# Patient Record
Sex: FEMALE | Race: WHITE | HISPANIC OR LATINO
[De-identification: names, ages, dates, MRNs, and addresses within clinical notes are randomized per-mention and may not be internally consistent; named-entity substitution may affect disease eponyms.]

---
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
## 2019-06-09 ENCOUNTER — TRANSCRIPTION ENCOUNTER (OUTPATIENT)
Age: 19
End: 2019-06-09

## 2019-06-12 ENCOUNTER — OTHER (OUTPATIENT)
Age: 19
End: 2019-06-12

## 2019-06-21 ENCOUNTER — APPOINTMENT (OUTPATIENT)
Dept: PEDIATRICS | Facility: CLINIC | Age: 19
End: 2019-06-21
Payer: COMMERCIAL

## 2019-06-21 VITALS
SYSTOLIC BLOOD PRESSURE: 102 MMHG | HEIGHT: 67 IN | WEIGHT: 172.8 LBS | DIASTOLIC BLOOD PRESSURE: 54 MMHG | HEART RATE: 71 BPM | BODY MASS INDEX: 27.12 KG/M2

## 2019-06-21 DIAGNOSIS — M93.002 UNSPECIFIED SLIPPED UPPER FEMORAL EPIPHYSIS (NONTRAUMATIC), LEFT HIP: ICD-10-CM

## 2019-06-21 PROCEDURE — 90651 9VHPV VACCINE 2/3 DOSE IM: CPT

## 2019-06-21 PROCEDURE — 90471 IMMUNIZATION ADMIN: CPT

## 2019-06-21 PROCEDURE — 99395 PREV VISIT EST AGE 18-39: CPT | Mod: 25

## 2019-06-21 PROCEDURE — 92552 PURE TONE AUDIOMETRY AIR: CPT

## 2019-06-21 NOTE — DISCUSSION/SUMMARY
[] : The components of the vaccine(s) to be administered today are listed in the plan of care. The disease(s) for which the vaccine(s) are intended to prevent and the risks have been discussed with the caretaker.  The risks are also included in the appropriate vaccination information statements which have been provided to the patient's caregiver.  The caregiver has given consent to vaccinate. [Met privately with the adolescent for part of the office visit?] : Met privately with the adolescent for part of the office visit? Yes [Adolescent demonstrates understanding of his/her conditions and how to take prescribed medications?] : Adolescent demonstrates understanding of his/her conditions and how to take prescribed medications? Yes [Adolescent asks questions during each office  visit and participates in the care plan?] : Adolescent asks questions during each office visit and participates in the care plan? Yes

## 2019-06-21 NOTE — PHYSICAL EXAM
[Alert] : alert [No Acute Distress] : no acute distress [Normocephalic] : normocephalic [EOMI Bilateral] : EOMI bilateral [Clear tympanic membranes with bony landmarks and light reflex present bilaterally] : clear tympanic membranes with bony landmarks and light reflex present bilaterally  [Pink Nasal Mucosa] : pink nasal mucosa [Nonerythematous Oropharynx] : nonerythematous oropharynx [Supple, full passive range of motion] : supple, full passive range of motion [No Palpable Masses] : no palpable masses [Clear to Ausculatation Bilaterally] : clear to auscultation bilaterally [Normal S1, S2 audible] : normal S1, S2 audible [Regular Rate and Rhythm] : regular rate and rhythm [No Murmurs] : no murmurs [+2 Femoral Pulses] : +2 femoral pulses [Soft] : soft [NonTender] : non tender [Non Distended] : non distended [Normoactive Bowel Sounds] : normoactive bowel sounds [No Hepatomegaly] : no hepatomegaly [No Splenomegaly] : no splenomegaly [Shilo: ____] : Shilo [unfilled] [Shilo: _____] : Shilo [unfilled] [No Abnormal Lymph Nodes Palpated] : no abnormal lymph nodes palpated [Normal Muscle Tone] : normal muscle tone [No Gait Asymmetry] : no gait asymmetry [Straight] : straight [No pain or deformities with palpation of bone, muscles, joints] : no pain or deformities with palpation of bone, muscles, joints [No Scoliosis] : no scoliosis [+2 Patella DTR] : +2 patella DTR [Cranial Nerves Grossly Intact] : cranial nerves grossly intact [No Rash or Lesions] : no rash or lesions

## 2020-03-29 PROBLEM — M93.002 SLIPPED PROXIMAL FEMORAL EPIPHYSIS OF LEFT HIP: Status: RESOLVED | Noted: 2019-06-21 | Resolved: 2020-03-29

## 2020-04-14 ENCOUNTER — TRANSCRIPTION ENCOUNTER (OUTPATIENT)
Age: 20
End: 2020-04-14

## 2020-06-02 ENCOUNTER — APPOINTMENT (OUTPATIENT)
Dept: PEDIATRICS | Facility: CLINIC | Age: 20
End: 2020-06-02
Payer: COMMERCIAL

## 2020-06-02 VITALS — WEIGHT: 160.4 LBS | TEMPERATURE: 96 F

## 2020-06-02 DIAGNOSIS — R10.31 RIGHT LOWER QUADRANT PAIN: ICD-10-CM

## 2020-06-02 DIAGNOSIS — N94.89 OTHER SPECIFIED CONDITIONS ASSOCIATED WITH FEMALE GENITAL ORGANS AND MENSTRUAL CYCLE: ICD-10-CM

## 2020-06-02 PROCEDURE — 99213 OFFICE O/P EST LOW 20 MIN: CPT

## 2020-06-02 NOTE — HISTORY OF PRESENT ILLNESS
[de-identified] : pain in RLQ x 1 day with blood in stool this morning with some nausea, no vomiting, afebrile. [FreeTextEntry6] : started this am and is steady pain - 4-5/10, difficulty walking and driving with right foot\par tried hopping here in office and it hurts\par 5/11-17 LMP \par had normal stool this AM but had some blood on stool and toilet paper

## 2020-06-02 NOTE — PHYSICAL EXAM
[NL] : no acute distress, alert [FreeTextEntry9] : tender RLQ- unable to hop or push leg into abdomen-no CVA tenderness- patient is having 5+ pain now- looks very uncomfortable

## 2020-06-02 NOTE — REVIEW OF SYSTEMS
[Vomiting] : no vomiting [Diarrhea] : no diarrhea [Constipation] : no constipation [Abdominal Pain] : abdominal pain [Negative] : Genitourinary

## 2020-06-22 ENCOUNTER — APPOINTMENT (OUTPATIENT)
Dept: PEDIATRICS | Facility: CLINIC | Age: 20
End: 2020-06-22

## 2020-07-13 ENCOUNTER — APPOINTMENT (OUTPATIENT)
Dept: PEDIATRICS | Facility: CLINIC | Age: 20
End: 2020-07-13

## 2020-07-19 ENCOUNTER — APPOINTMENT (OUTPATIENT)
Dept: PEDIATRICS | Facility: CLINIC | Age: 20
End: 2020-07-19
Payer: COMMERCIAL

## 2020-07-19 VITALS — WEIGHT: 161.4 LBS | TEMPERATURE: 97.5 F

## 2020-07-19 DIAGNOSIS — R10.9 UNSPECIFIED ABDOMINAL PAIN: ICD-10-CM

## 2020-07-19 LAB
HCG UR QL: NEGATIVE
POCT - MONO RAPID TEST: NEGATIVE
QUALITY CONTROL: YES

## 2020-07-19 PROCEDURE — 81025 URINE PREGNANCY TEST: CPT

## 2020-07-19 PROCEDURE — 99214 OFFICE O/P EST MOD 30 MIN: CPT | Mod: 25

## 2020-07-19 PROCEDURE — 86308 HETEROPHILE ANTIBODY SCREEN: CPT | Mod: QW

## 2020-07-19 NOTE — PHYSICAL EXAM
[Psoas Sign Negative] : psoas sign negative [Obturator Sign Negative] : obturator sign negative [de-identified] : three small, pea sized, mobile left occipital nodes palpable [NL] : warm [FreeTextEntry9] : generalized abdominal tenderness but more tenderness appreciated across upper quadrants b/l; RLQ and LLQ were pretty well tolerated for palpation. No HSM. No masses.

## 2020-07-19 NOTE — DISCUSSION/SUMMARY
[FreeTextEntry1] : 19y F seen for evaluation of n/v/fatigue/body aches and abdominal pain since yesterday.\par Urine pregnancy negative here.\par Mono spot negative here.\par Also with prolonged menses (after completing first month of OCP) and hx of ovarian cyst.\par PE most remarkable for abdominal pain across b/l upper quadrants.\par Pt has GYN appt tomorrow afternoon.\par Will order labs and abdominal ultrasound- pt will do tomorrow AM.\par If pt develops fever, worsening abdominal pain, or is unable to tolerate POs/maintain hydration, she should go to an ED for further evaluation.\par

## 2020-07-19 NOTE — HISTORY OF PRESENT ILLNESS
[de-identified] : nausea, body aches, no fevers, currently has period [FreeTextEntry6] : nausea, generalized body aches, fatigue x 4 days.\par Vomited food contents x 1 yesterday - NBNB emesis. \par Boyfriend tested + for mono on Tuesday.\par Pt was negative at that visit.\par She has progressively felt worse over the last few days. \par Of note, pt menstruating and last month, was found to have a right ovarian cyst.\par She has since established care with GYN and started OCPs one month ago.\par Menses is currently day 8. Cycle usually 5-6 days. \par They report they are sexually active and condom use is inconsistent.\par Pt denies hx of STIs or pregnancy.\par

## 2021-01-27 ENCOUNTER — APPOINTMENT (OUTPATIENT)
Dept: FAMILY MEDICINE | Facility: CLINIC | Age: 21
End: 2021-01-27
Payer: COMMERCIAL

## 2021-01-27 VITALS
OXYGEN SATURATION: 98 % | DIASTOLIC BLOOD PRESSURE: 78 MMHG | WEIGHT: 228 LBS | BODY MASS INDEX: 35.79 KG/M2 | TEMPERATURE: 98.7 F | HEART RATE: 90 BPM | SYSTOLIC BLOOD PRESSURE: 102 MMHG | HEIGHT: 67 IN

## 2021-01-27 DIAGNOSIS — D50.9 IRON DEFICIENCY ANEMIA, UNSPECIFIED: ICD-10-CM

## 2021-01-27 DIAGNOSIS — Z00.00 ENCOUNTER FOR GENERAL ADULT MEDICAL EXAMINATION W/OUT ABNORMAL FINDINGS: ICD-10-CM

## 2021-01-27 DIAGNOSIS — L30.9 DERMATITIS, UNSPECIFIED: ICD-10-CM

## 2021-01-27 DIAGNOSIS — Z3A.25 25 WEEKS GESTATION OF PREGNANCY: ICD-10-CM

## 2021-01-27 PROCEDURE — 99385 PREV VISIT NEW AGE 18-39: CPT | Mod: 25

## 2021-01-27 PROCEDURE — 99072 ADDL SUPL MATRL&STAF TM PHE: CPT

## 2021-01-27 PROCEDURE — G0444 DEPRESSION SCREEN ANNUAL: CPT | Mod: NC,59

## 2021-01-27 PROCEDURE — 36415 COLL VENOUS BLD VENIPUNCTURE: CPT

## 2021-01-27 RX ORDER — TRIAMCINOLONE ACETONIDE 0.25 MG/G
0.03 CREAM TOPICAL
Qty: 1 | Refills: 1 | Status: ACTIVE | COMMUNITY
Start: 2021-01-27 | End: 1900-01-01

## 2021-01-27 NOTE — ADDENDUM
[FreeTextEntry1] : I, Wendy Valdes acting as a scribe for Dr. Cherry Justice on Jan 27, 2021  at 10:02 AM\par

## 2021-01-27 NOTE — PLAN
[FreeTextEntry1] : \par - Blood work today \par - Will check anemia panel. Depending on results will decide what the proper treatment option is. Goal of Hemoglobin above 10, if <10 will send to specialist. \par - Eczema: Triamcinolone 0.025% external cream ordered. Advised using OTC Benadryl spray when itching. Can use Aquaphor to help with the dryness.

## 2021-01-27 NOTE — HISTORY OF PRESENT ILLNESS
[Other: _____] : [unfilled] [FreeTextEntry1] : NP-CPE \par Pt. c/o flare up with eczema [de-identified] : NADEEM is a 20 year female here as a new patient establishing care. Mood is good. Currently taking prenatals. Pt reports she is 25-26 weeks pregnant with a baby girl. Will be followed by OBGYN Dr. Mcgarry. States overall her pregnancy has been going well other than spotting yesterday, but was relieved when she felt the baby move. Notes she had blood work done [facility] and was told she was anemic. States she has been anemic in the past. Older sister is anemic. Notes her periods were always very irregular and heavy. Pt is wondering if she could start doing iron infusions. Complaining today of eczema flare up on arm. States it is worsening. \par

## 2021-01-27 NOTE — HEALTH RISK ASSESSMENT
[Patient reported PAP Smear was normal] : Patient reported PAP Smear was normal [Good] : ~his/her~  mood as  good [No] : In the past 12 months have you used drugs other than those required for medical reasons? No [0] : 2) Feeling down, depressed, or hopeless: Not at all (0) [None] : None [With Significant Other] : lives with significant other [Unemployed] : unemployed [Significant Other] : lives with significant other [Feels Safe at Home] : Feels safe at home [] : No [PUK1Ahcok] : 0 [PapSmearComments] : Dr. Mason Prince [PapSmearDate] : 12/20

## 2021-01-27 NOTE — END OF VISIT
[FreeTextEntry3] : Medical record entries made by the scribe today today, were at my direction and personally dictated to them by me, Dr. Cherry Justice on Jan 27, 2021. I have reviewed the chart and agree that the record accurately reflects my personal performance of the history, physical exam, assessment, and plan.\par

## 2021-02-01 LAB
25(OH)D3 SERPL-MCNC: 21.4 NG/ML
ALBUMIN SERPL ELPH-MCNC: 3.6 G/DL
ALP BLD-CCNC: 47 U/L
ALT SERPL-CCNC: 12 U/L
ANION GAP SERPL CALC-SCNC: 14 MMOL/L
APPEARANCE: CLEAR
AST SERPL-CCNC: 17 U/L
BACTERIA: NEGATIVE
BASOPHILS # BLD AUTO: 0.04 K/UL
BASOPHILS NFR BLD AUTO: 0.6 %
BILIRUB SERPL-MCNC: <0.2 MG/DL
BILIRUBIN URINE: NEGATIVE
BLOOD URINE: NEGATIVE
BUN SERPL-MCNC: 12 MG/DL
CALCIUM SERPL-MCNC: 9 MG/DL
CHLORIDE SERPL-SCNC: 105 MMOL/L
CHOLEST SERPL-MCNC: 182 MG/DL
CO2 SERPL-SCNC: 19 MMOL/L
COLOR: NORMAL
CREAT SERPL-MCNC: 0.67 MG/DL
EOSINOPHIL # BLD AUTO: 0.09 K/UL
EOSINOPHIL NFR BLD AUTO: 1.5 %
ESTIMATED AVERAGE GLUCOSE: 91 MG/DL
FERRITIN SERPL-MCNC: 9 NG/ML
FOLATE SERPL-MCNC: 13.5 NG/ML
GLUCOSE QUALITATIVE U: NEGATIVE
GLUCOSE SERPL-MCNC: 69 MG/DL
HBA1C MFR BLD HPLC: 4.8 %
HCT VFR BLD CALC: 38.9 %
HDLC SERPL-MCNC: 88 MG/DL
HGB BLD-MCNC: 12.3 G/DL
HYALINE CASTS: 0 /LPF
IMM GRANULOCYTES NFR BLD AUTO: 1.3 %
IRON SATN MFR SERPL: 14 %
IRON SERPL-MCNC: 65 UG/DL
KETONES URINE: NEGATIVE
LDLC SERPL CALC-MCNC: 81 MG/DL
LEUKOCYTE ESTERASE URINE: NEGATIVE
LYMPHOCYTES # BLD AUTO: 1.18 K/UL
LYMPHOCYTES NFR BLD AUTO: 19.1 %
MAN DIFF?: NORMAL
MCHC RBC-ENTMCNC: 30 PG
MCHC RBC-ENTMCNC: 31.6 GM/DL
MCV RBC AUTO: 94.9 FL
MICROSCOPIC-UA: NORMAL
MONOCYTES # BLD AUTO: 0.4 K/UL
MONOCYTES NFR BLD AUTO: 6.5 %
NEUTROPHILS # BLD AUTO: 4.4 K/UL
NEUTROPHILS NFR BLD AUTO: 71 %
NITRITE URINE: NEGATIVE
NONHDLC SERPL-MCNC: 94 MG/DL
PH URINE: 6
PLATELET # BLD AUTO: 163 K/UL
POTASSIUM SERPL-SCNC: 4 MMOL/L
PROT SERPL-MCNC: 6.4 G/DL
PROTEIN URINE: NORMAL
RBC # BLD: 4.1 M/UL
RBC # FLD: 14.7 %
RED BLOOD CELLS URINE: 1 /HPF
SARS-COV-2 IGG SERPL IA-ACNC: 1.65 INDEX
SARS-COV-2 IGG SERPL QL IA: POSITIVE
SODIUM SERPL-SCNC: 138 MMOL/L
SPECIFIC GRAVITY URINE: 1.03
SQUAMOUS EPITHELIAL CELLS: 1 /HPF
TIBC SERPL-MCNC: 451 UG/DL
TRIGL SERPL-MCNC: 68 MG/DL
TSH SERPL-ACNC: 1.4 UIU/ML
UIBC SERPL-MCNC: 386 UG/DL
URATE SERPL-MCNC: 3.8 MG/DL
UROBILINOGEN URINE: NORMAL
VIT B12 SERPL-MCNC: 247 PG/ML
WBC # FLD AUTO: 6.19 K/UL
WHITE BLOOD CELLS URINE: 2 /HPF

## 2021-04-19 ENCOUNTER — NON-APPOINTMENT (OUTPATIENT)
Age: 21
End: 2021-04-19

## 2022-07-04 ENCOUNTER — NON-APPOINTMENT (OUTPATIENT)
Age: 22
End: 2022-07-04

## 2024-08-05 ENCOUNTER — APPOINTMENT (OUTPATIENT)
Dept: ORTHOPEDIC SURGERY | Facility: CLINIC | Age: 24
End: 2024-08-05

## 2024-08-05 PROBLEM — M67.952 TENDINOPATHY OF LEFT GLUTEAL REGION: Status: ACTIVE | Noted: 2024-08-05

## 2024-08-05 PROBLEM — M16.12 PRIMARY LOCALIZED OSTEOARTHRITIS OF LEFT HIP: Status: ACTIVE | Noted: 2024-08-05

## 2024-08-05 PROCEDURE — 99204 OFFICE O/P NEW MOD 45 MIN: CPT

## 2024-08-05 PROCEDURE — 73503 X-RAY EXAM HIP UNI 4/> VIEWS: CPT | Mod: LT

## 2024-08-05 NOTE — PHYSICAL EXAM
[de-identified] : General: Well appearing, no acute distress Neurologic: A&Ox3, No focal deficits Head: NCAT without abrasions, lacerations, or ecchymosis to head, face, or scalp Eyes: No scleral icterus, no gross abnormalities Respiratory: Equal chest wall expansion bilaterally, no accessory muscle use Lymphatic: No lymphadenopathy palpated Skin: Warm and dry Psychiatric: Normal mood and affect  Examination of the Left hip reveals no obvious deformity or leg length discrepancy. There is no swelling noted. The patient is tender over greater trochanter, IT band, and right over plate in place. Nontender to palpation to groin. The patients range of motion is to 110 degrees of hip flexion, 40 degrees of abduction, 20 degrees of adduction, limited internal rotation secondary to pain, and 45 degrees of external rotation. The patient has 5/5 strength to resisted hip flexion, abduction and adduction with pain. The patient has a negative BRIDGETTE and FADIR Test. Pain with internal rotation of outer hip. Negative Mckinney Test. Negative resisted SLR test at 30 degrees of hip flexion ([facility]). Negative Piriformis Test. No SI joint instability. There is no pain with logrolling. The calf and thigh are soft and nontender bilaterally. The patient is grossly neurovascularly intact distally. [de-identified] : The following radiographs were ordered and read by me during this patient's visit. I reviewed each radiograph in detail with the patient and discussed the findings as highlighted below. 4 views of the left hip were obtained today that show no fracture, dislocation. There are mild arthritic changes noted. There is no malalignment. No obvious osseous abnormality. Otherwise unremarkable.

## 2024-08-05 NOTE — DISCUSSION/SUMMARY
[de-identified] : We had a thorough discussion regarding the nature of her pain, the pathophysiology, as well as all treatment options. I discussed operative and non-operative treatment modalities. Patient was given prescription of formal physical therapy that she will perform 2x/wk for 6-8 wks. All questions were answered and the patient verbalized understanding. The patient is in agreement with this treatment plan. Patient will follow up in 6-8 wks for repeat clinical assessment.

## 2024-08-05 NOTE — ADDENDUM
[FreeTextEntry1] : Documented by Dorothy Magana acting as a scribe for Dr. Underwood and French Wiggins PA-C on 08/05/2024. All medical record entries made by the Scribe were at my, Dr. Underwood, and French Wiggins's, direction and personally dictated by me on 08/05/2024. I have reviewed the chart and agree that the record accurately reflects my personal performance of the history, physical exam, procedure and imaging.

## 2024-08-05 NOTE — CONSULT LETTER
[Dear  ___] : Dear  [unfilled], [Consult Letter:] : I had the pleasure of evaluating your patient, [unfilled]. [Please see my note below.] : Please see my note below. [Sincerely,] : Sincerely, [FreeTextEntry3] : Dr. Kapil Underwood

## 2024-08-05 NOTE — HISTORY OF PRESENT ILLNESS
[Worsening] : worsening [___ yrs] : [unfilled] year(s) ago [Direct Pressure] : worsened by direct pressure [Hip Movement] : worsened by hip movement [Lifting] : worsened by lifting [Sitting] : worsened by sitting [Running] : worsened by running [Walking] : worsened by walking [de-identified] : NADEEM GUILLORY (23y) reports to the office today c/o left hip pain. She reports her initial injury happening when she was 11 years old and had the initial surgery to fix her femur. In 2015 she states her pediatric physician at [facility] performed another surgery to shave down calcifications and fix the hardware. She is starting to work out again at the gym weight training and started kickboxing 2 months ago and reports having limited range of motion and pain on the outside of her hip with all activity. No medications. Does not heat or ice at home. 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